# Patient Record
Sex: MALE | Race: OTHER | HISPANIC OR LATINO | URBAN - METROPOLITAN AREA
[De-identification: names, ages, dates, MRNs, and addresses within clinical notes are randomized per-mention and may not be internally consistent; named-entity substitution may affect disease eponyms.]

---

## 2022-09-05 ENCOUNTER — EMERGENCY (EMERGENCY)
Facility: HOSPITAL | Age: 57
LOS: 1 days | Discharge: HOME | End: 2022-09-05
Attending: EMERGENCY MEDICINE
Payer: MEDICAID

## 2022-09-05 VITALS
TEMPERATURE: 98 F | RESPIRATION RATE: 20 BRPM | SYSTOLIC BLOOD PRESSURE: 134 MMHG | HEART RATE: 122 BPM | DIASTOLIC BLOOD PRESSURE: 98 MMHG | OXYGEN SATURATION: 98 % | WEIGHT: 149.91 LBS | HEIGHT: 65 IN

## 2022-09-05 VITALS — HEART RATE: 90 BPM

## 2022-09-05 PROCEDURE — 73590 X-RAY EXAM OF LOWER LEG: CPT | Mod: 26,LT,76

## 2022-09-05 PROCEDURE — 99284 EMERGENCY DEPT VISIT MOD MDM: CPT | Mod: 25

## 2022-09-05 PROCEDURE — 73610 X-RAY EXAM OF ANKLE: CPT

## 2022-09-05 PROCEDURE — 73590 X-RAY EXAM OF LOWER LEG: CPT

## 2022-09-05 PROCEDURE — 73620 X-RAY EXAM OF FOOT: CPT

## 2022-09-05 PROCEDURE — 29515 APPLICATION SHORT LEG SPLINT: CPT

## 2022-09-05 PROCEDURE — 99284 EMERGENCY DEPT VISIT MOD MDM: CPT

## 2022-09-05 PROCEDURE — 73610 X-RAY EXAM OF ANKLE: CPT | Mod: 26,RT

## 2022-09-05 PROCEDURE — 73620 X-RAY EXAM OF FOOT: CPT | Mod: 26,RT

## 2022-09-05 RX ORDER — IBUPROFEN 200 MG
600 TABLET ORAL ONCE
Refills: 0 | Status: COMPLETED | OUTPATIENT
Start: 2022-09-05 | End: 2022-09-05

## 2022-09-05 RX ADMIN — Medication 600 MILLIGRAM(S): at 12:58

## 2022-09-05 NOTE — ED PROVIDER NOTE - PATIENT PORTAL LINK FT
You can access the FollowMyHealth Patient Portal offered by [facility] by registering at the following website: [email or web address]. By joining [employer]’s FollowMyHealth portal, you will also be able to view your health information using other applications (apps) compatible with our system.

## 2022-09-05 NOTE — ED PROVIDER NOTE - OBJECTIVE STATEMENT
56 M denies hx presents to ed c/o RLE pain s/p trip and fall down 5 stairs. No head/neck trauma. Fell onto b/l LE and R arm. Has large abrasions to L anterior lower leg and R upper arm. Is only c/o pain to RLE and L lower leg. Denies pain to anywhere else. States his tetanus is UTD.

## 2022-09-05 NOTE — ED PROVIDER NOTE - CARE PROVIDER_API CALL
Lyndsey Linares)  Orthopedics  [address] [place] [address], [address]  [place] [address]  Phone: [phone]  Fax: [phone]  Follow Up Time: 1-3 Days

## 2022-09-05 NOTE — ED PROVIDER NOTE - PROGRESS NOTE DETAILS
Avulsion fx of R medial malleolus on films   -Splinted by ortho rotator  -Given f/u info for ortho  -NSAIDs rx with percs prn  -Return precautions discussed  -F/u PCP

## 2022-09-05 NOTE — ED ADULT TRIAGE NOTE - CHIEF COMPLAINT QUOTE
Pt arrives to ED s/p  mechanical fall  outside. C/O R ankle pain / Also L shin noted big abrasion - bleeding controlled. denies head injury or LOC

## 2022-09-05 NOTE — ED PROVIDER NOTE - MUSCULOSKELETAL, MLM
No tenderness palpated throughout upper extremities b/l. R ankle with medial swelling, ecchymosis and tenderness, decrease ankle rom 2/2 pain. L anterior lower leg with minimal tenderness. No L ankle swelling or tenderness. No spinal tenderness throughout. No signs of head/neck trauma.

## 2022-09-05 NOTE — ED PROVIDER NOTE - NS ED ATTENDING STATEMENT MOD
This was a shared visit with the SEMAJ. I reviewed and verified the documentation and independently performed the documented:

## 2022-09-07 ENCOUNTER — APPOINTMENT (OUTPATIENT)
Dept: ORTHOPEDIC SURGERY | Facility: CLINIC | Age: 57
End: 2022-09-07

## 2022-09-07 VITALS
DIASTOLIC BLOOD PRESSURE: 97 MMHG | HEART RATE: 111 BPM | BODY MASS INDEX: 29.45 KG/M2 | HEIGHT: 60 IN | WEIGHT: 150 LBS | SYSTOLIC BLOOD PRESSURE: 177 MMHG

## 2022-09-07 DIAGNOSIS — Z78.9 OTHER SPECIFIED HEALTH STATUS: ICD-10-CM

## 2022-09-07 DIAGNOSIS — Z83.3 FAMILY HISTORY OF DIABETES MELLITUS: ICD-10-CM

## 2022-09-07 PROBLEM — Z00.00 ENCOUNTER FOR PREVENTIVE HEALTH EXAMINATION: Status: ACTIVE | Noted: 2022-09-07

## 2022-09-07 PROCEDURE — 99204 OFFICE O/P NEW MOD 45 MIN: CPT

## 2022-09-13 PROBLEM — Z78.9 NO PERTINENT PAST MEDICAL HISTORY: Status: RESOLVED | Noted: 2022-09-07 | Resolved: 2022-09-13

## 2022-09-14 ENCOUNTER — APPOINTMENT (OUTPATIENT)
Dept: ORTHOPEDIC SURGERY | Facility: CLINIC | Age: 57
End: 2022-09-14

## 2022-09-14 VITALS
WEIGHT: 150 LBS | BODY MASS INDEX: 24.99 KG/M2 | HEART RATE: 126 BPM | DIASTOLIC BLOOD PRESSURE: 88 MMHG | HEIGHT: 65 IN | SYSTOLIC BLOOD PRESSURE: 149 MMHG

## 2022-09-14 DIAGNOSIS — S82.54XA NONDISPLACED FRACTURE OF MEDIAL MALLEOLUS OF RIGHT TIBIA, INITIAL ENCOUNTER FOR CLOSED FRACTURE: ICD-10-CM

## 2022-09-14 PROCEDURE — 99214 OFFICE O/P EST MOD 30 MIN: CPT | Mod: 57

## 2022-09-14 PROCEDURE — 27760 CLTX MEDIAL ANKLE FX: CPT | Mod: RT

## 2022-09-14 PROCEDURE — 73610 X-RAY EXAM OF ANKLE: CPT | Mod: RT

## 2022-09-14 NOTE — DISCUSSION/SUMMARY
[de-identified] : The patient presents today with clinical exam findings and radiographic imaging consistent with a minimally displaced medial mal fracture. Based on the fracture pattern and the ankle stability, it does not require surgical intervention. The ankle mortise appears quite stable and as such should be able to be treated nonoperatively. We will allow the patient to weight-bear as tolerated in a cam walker boot. The patient should come back in one month for repeat x-rays to evaluate for bony healing. At that time we will likely advance to an ankle brace for rotational support for one additional month. \par \par The question of when to drive is impossible to generalize to everyone because it is largely dependent on the individual.  Importantly, doctors do not have a license with the DMV to "clear you" or "release you" to return to driving.  There are 3 primary criteria that must be met.  You need to be off of narcotic pain medicines (otherwise you are driving under the influence).  You need to be able to get in and out of the 's seat comfortably.  And you must have regained your normal reflexes / strength.  Also, return to driving depends partly on what side had surgery (ie. Right leg operates the pedals; people with Left side surgery can generally get back to driving much sooner unless you have a clutch).  The average time to return to driving is around 2 weeks after you return to normal walking for the right side and usually sooner for the left.  We recommend 'testing' yourself with another licensed  in an empty parking lot or quiet street first in order to check your reflexes moving your foot from pedal to pedal.\par \par The patient was given the opportunity to ask questions and all questions were answered to their satisfaction.\par \par  other than good wound that there are new femur nail give him some carotid it is it is different than the old ones

## 2022-09-14 NOTE — HISTORY OF PRESENT ILLNESS
[3] : a current pain level of 3/10 [Bending] : worsened by bending [Lifting] : worsened by lifting [Weight Bearing] : worsened by weight bearing [Recumbency] : relieved by recumbency [Rest] : relieved by rest [de-identified] : Mr. ANNA ROBERT is a pleasant 56 year old male, independent ambulator.\par He is Faroese speaking and a  was used to translate. \par \par He presents to the office for evaluation of right ankle injury after falling off a few steps at home on 9/4/22. He was seen at [facility] emergency room where imaging reportedly showed nondisplaced medial malleolus fracture on the right. He was placed in a posterior splint and referred to our office for follow up. \par \par He denies prior injury.\par  [de-identified] : aching

## 2022-09-14 NOTE — PHYSICAL EXAM
[de-identified] : Physical Exam:\par General: Well appearing, no acute distress, A&O\par Neurologic: A&Ox3, No focal deficits\par Head: NCAT without abrasions, lacerations, or ecchymosis to head, face, or scalp\par Respiratory: Equal chest wall expansion bilaterally, no accessory muscle use\par Lymphatic: No lymphadenopathy palpated\par Skin: Warm and dry\par Psychiatric: Normal mood and affect\par \par RLE\par SILT s/s/sp/dp/t\par Fires EHL/FHL/GS/TA\par 2+ DP/PT pulse\par brisk capillary refill\par +TTP medial ankle [de-identified] : right ankle (3 views) from today in office compared to [place] (9/5/2022) were reviewed with patient, showing nondisplaced medial malleolus fracture with slight comminution. no loss of alignment\par \par

## 2022-10-19 ENCOUNTER — APPOINTMENT (OUTPATIENT)
Dept: ORTHOPEDIC SURGERY | Facility: CLINIC | Age: 57
End: 2022-10-19

## 2022-10-19 VITALS
WEIGHT: 160 LBS | BODY MASS INDEX: 26.66 KG/M2 | TEMPERATURE: 98.1 F | HEART RATE: 105 BPM | DIASTOLIC BLOOD PRESSURE: 95 MMHG | SYSTOLIC BLOOD PRESSURE: 145 MMHG | HEIGHT: 65 IN

## 2022-10-19 DIAGNOSIS — S82.54XD NONDISPLACED FRACTURE OF MEDIAL MALLEOLUS OF RIGHT TIBIA, SUBSEQUENT ENCOUNTER FOR CLOSED FRACTURE WITH ROUTINE HEALING: ICD-10-CM

## 2022-10-19 PROCEDURE — 73610 X-RAY EXAM OF ANKLE: CPT | Mod: RT

## 2022-10-19 PROCEDURE — 99213 OFFICE O/P EST LOW 20 MIN: CPT

## 2022-10-19 NOTE — ASSESSMENT
[FreeTextEntry1] : 56 year old male with L nondisplaced medial malleolus fracture (DOI: 9/4/22). We discussed that based on his clinical exam and radiographs, the patient can return to work and activity without restriction. He was advised that he can return to the office on an as needed basis going forward. The condition, natural history, and prognosis were explained to the patient and family. The clinical findings and images were reviewed with the patient.All questions and concerns were addressed during today's visit. The patient verbalized an understanding and are in agreement with the above plan.\par

## 2022-10-19 NOTE — DISCUSSION/SUMMARY
[de-identified] : Orthopaedic Trauma Surgeon Addendum:\par \par I agree with the above resident physician note.  \par Chart was reviewed and patient examined in the orthopedic office. I agree with the assessment and plan of the resident.\par \par I was physically present for the key portions of the evaluation and management service provided. I agree with the above history, physical and plan. Appropriate imaging has been reviewed and the plan adjusted as needed.\par \par Lam Ferris MD\par Orthopaedic Trauma Surgeon\par [facility]\par [facility]

## 2022-10-19 NOTE — HISTORY OF PRESENT ILLNESS
[Improving] : improving [0] : a current pain level of 0/10 [None] : No relieving factors are noted [de-identified] : Mr. ANNA ROBERT is a pleasant 56 year old male, independent ambulator.\par He is Macedonian speaking and a  was used to translate. \par \par He presents to the office for evaluation of right ankle injury after falling off a few steps at home on 9/4/22. He was seen at [facility] emergency room where imaging reportedly showed nondisplaced medial malleolus fracture on the right. He was placed in a posterior splint and referred to our office for follow up. \par \par INTERVAL HISTORY (10/19/22): Today, Mr. Robert reports he is doing well. He denies having any acute complaints at this time. He denies having any current pain and states he is able to ambulate without any difficulty. He would like to return to work as a  as soon as possible. Denies any numbness or tingling. \par

## 2022-10-19 NOTE — PHYSICAL EXAM
[de-identified] : Physical Exam:\par General: Well appearing, no acute distress, A&O\par Neurologic: A&Ox3, No focal deficits\par Head: NCAT without abrasions, lacerations, or ecchymosis to head, face, or scalp\par Respiratory: Equal chest wall expansion bilaterally, no accessory muscle use\par Lymphatic: No lymphadenopathy palpated\par Skin: Warm and dry\par Psychiatric: Normal mood and affect\par \par RLE\par SILT s/s/sp/dp/t\par Fires EHL/FHL/GS/TA\par 2+ DP/PT pulse\par brisk capillary refill\par No bony tenderness to palpation\par Range of Motion: Full AROM ankle [de-identified] : XR L Ankle (3 views, 10/19/22): Demonstrates healed medial malleolus fracture in good alignment.